# Patient Record
Sex: MALE | Race: OTHER
[De-identification: names, ages, dates, MRNs, and addresses within clinical notes are randomized per-mention and may not be internally consistent; named-entity substitution may affect disease eponyms.]

---

## 2021-05-19 ENCOUNTER — HOSPITAL ENCOUNTER (EMERGENCY)
Dept: HOSPITAL 12 - ER | Age: 31
LOS: 1 days | Discharge: HOME | End: 2021-05-20
Payer: SELF-PAY

## 2021-05-19 VITALS — BODY MASS INDEX: 24.25 KG/M2 | WEIGHT: 160 LBS | HEIGHT: 68 IN

## 2021-05-19 DIAGNOSIS — F15.159: Primary | ICD-10-CM

## 2021-05-19 DIAGNOSIS — R00.0: ICD-10-CM

## 2021-05-19 DIAGNOSIS — E87.2: ICD-10-CM

## 2021-05-19 DIAGNOSIS — R94.31: ICD-10-CM

## 2021-05-19 DIAGNOSIS — F15.129: ICD-10-CM

## 2021-05-19 LAB
ALP SERPL-CCNC: 75 U/L (ref 50–136)
ALT SERPL W/O P-5'-P-CCNC: 35 U/L (ref 16–63)
APAP SERPL-MCNC: < 2 UG/ML (ref 10–30)
AST SERPL-CCNC: 38 U/L (ref 15–37)
BASOPHILS # BLD AUTO: 0 K/UL (ref 0–8)
BASOPHILS NFR BLD AUTO: 0.3 % (ref 0–2)
BILIRUB DIRECT SERPL-MCNC: 0.1 MG/DL (ref 0–0.2)
BILIRUB SERPL-MCNC: 0.3 MG/DL (ref 0.2–1)
BUN SERPL-MCNC: 16 MG/DL (ref 7–18)
CHLORIDE SERPL-SCNC: 102 MMOL/L (ref 98–107)
CO2 SERPL-SCNC: 23 MMOL/L (ref 21–32)
CREAT SERPL-MCNC: 1.2 MG/DL (ref 0.6–1.3)
EOSINOPHIL # BLD AUTO: 0.4 K/UL (ref 0–0.7)
EOSINOPHIL NFR BLD AUTO: 2.7 % (ref 0–7)
ETHANOL SERPL-MCNC: < 3 MG/DL (ref 0–0)
GLUCOSE SERPL-MCNC: 137 MG/DL (ref 74–106)
HCT VFR BLD AUTO: 45 % (ref 36.7–47.1)
HGB BLD-MCNC: 14.6 G/DL (ref 12.5–16.3)
LYMPHOCYTES # BLD AUTO: 6.2 K/UL (ref 20–40)
LYMPHOCYTES NFR BLD AUTO: 41.6 % (ref 20.5–51.5)
MCH RBC QN AUTO: 31.7 UUG (ref 23.8–33.4)
MCHC RBC AUTO-ENTMCNC: 32 G/DL (ref 32.5–36.3)
MCV RBC AUTO: 97.8 FL (ref 73–96.2)
MONOCYTES # BLD AUTO: 1.4 K/UL (ref 2–10)
MONOCYTES NFR BLD AUTO: 9.5 % (ref 0–11)
NEUTROPHILS # BLD AUTO: 6.9 K/UL (ref 1.8–8.9)
NEUTROPHILS NFR BLD AUTO: 45.9 % (ref 38.5–71.5)
PLATELET # BLD AUTO: 406 K/UL (ref 152–348)
POTASSIUM SERPL-SCNC: 3.8 MMOL/L (ref 3.5–5.1)
RBC # BLD AUTO: 4.6 MIL/UL (ref 4.06–5.63)
WBC # BLD AUTO: 15 K/UL (ref 3.6–10.2)
WS STN SPEC: 8 G/DL (ref 6.4–8.2)

## 2021-05-19 PROCEDURE — 96376 TX/PRO/DX INJ SAME DRUG ADON: CPT

## 2021-05-19 PROCEDURE — 80048 BASIC METABOLIC PNL TOTAL CA: CPT

## 2021-05-19 PROCEDURE — 99285 EMERGENCY DEPT VISIT HI MDM: CPT

## 2021-05-19 PROCEDURE — 80307 DRUG TEST PRSMV CHEM ANLYZR: CPT

## 2021-05-19 PROCEDURE — 71045 X-RAY EXAM CHEST 1 VIEW: CPT

## 2021-05-19 PROCEDURE — 85025 COMPLETE CBC W/AUTO DIFF WBC: CPT

## 2021-05-19 PROCEDURE — 80320 DRUG SCREEN QUANTALCOHOLS: CPT

## 2021-05-19 PROCEDURE — 96374 THER/PROPH/DIAG INJ IV PUSH: CPT

## 2021-05-19 PROCEDURE — 36415 COLL VENOUS BLD VENIPUNCTURE: CPT

## 2021-05-19 PROCEDURE — 96375 TX/PRO/DX INJ NEW DRUG ADDON: CPT

## 2021-05-19 PROCEDURE — 80076 HEPATIC FUNCTION PANEL: CPT

## 2021-05-19 PROCEDURE — 93005 ELECTROCARDIOGRAM TRACING: CPT

## 2021-05-19 PROCEDURE — 83605 ASSAY OF LACTIC ACID: CPT

## 2021-05-19 PROCEDURE — 96361 HYDRATE IV INFUSION ADD-ON: CPT

## 2021-05-19 PROCEDURE — 80299 QUANTITATIVE ASSAY DRUG: CPT

## 2021-05-19 PROCEDURE — 81001 URINALYSIS AUTO W/SCOPE: CPT

## 2021-05-19 PROCEDURE — G0480 DRUG TEST DEF 1-7 CLASSES: HCPCS

## 2021-05-19 NOTE — NUR
Pt. bib RA 83 from home. RA 83 reported a possible overdose. Pt. restless, 
uncooperative, and moving limbs and thrashing. Pt. unable to answer questions. 
Attempts made to orient and calm patient were unsuccessful. Pt. kicked and 
attempted to bite himself and staff. MD notified and 4 pt. restraints placed as 
ordered. Vss. Will continue to monitor.

## 2021-05-20 LAB
AMORPH URATE CRY URNS QL MICRO: (no result) /HPF
AMPHETAMINES UR QL SCN>1000 NG/ML: POSITIVE
APPEARANCE UR: (no result)
BILIRUB UR QL STRIP: NEGATIVE
COCAINE UR QL SCN: NEGATIVE
COLOR UR: YELLOW
DEPRECATED SQUAMOUS URNS QL MICRO: (no result) /HPF
GLUCOSE UR STRIP-MCNC: NEGATIVE MG/DL
HGB UR QL STRIP: NEGATIVE
KETONES UR STRIP-MCNC: NEGATIVE MG/DL
LEUKOCYTE ESTERASE UR QL STRIP: NEGATIVE
NITRITE UR QL STRIP: NEGATIVE
OPIATES UR QL SCN: NEGATIVE
PCP UR QL SCN>25 NG/ML: NEGATIVE
PH UR STRIP: 6.5 [PH] (ref 5–8)
RBC #/AREA URNS HPF: (no result) /HPF (ref 0–3)
SP GR UR STRIP: >=1.03 (ref 1–1.03)
THC UR QL SCN>50 NG/ML: POSITIVE
UROBILINOGEN UR STRIP-MCNC: 0.2 E.U./DL
WBC #/AREA URNS HPF: (no result) /HPF
WBC #/AREA URNS HPF: (no result) /HPF (ref 0–3)

## 2021-05-20 NOTE — NUR
Pt is awake, alert, oriented and ambulatory with steady gait. Pt ate and denied 
any SI/HI. No s/s of distress noted. Patient discharged to home in stable 
condition.  Written and verbal after care instructions given. Patient 
verbalized understanding of instructions. Stressed follow up or return to ER 
for worsening s/s.

## 2021-05-20 NOTE — NUR
Pt. still uncooperative. No longer trying to remove IV or kick staff, R wris 
and left leg restraints removed. neurovascular status intact. Will continue to 
monitor. Vss.

## 2021-05-20 NOTE — NUR
Staff removed alternating wrist and leg restraints intermittently but patient 
increased in agitation, thrashing, head hitting and attempted biting. Pt. 
danger to self and others. MD was notified. 4 pt. restraints reapplied and 
ativan and haldol were given.

## 2021-05-20 NOTE — NUR
L wrist restraint removed. Pt. calmer but still uncooperative. Pt. does not 
answer questions, thrashes. Neurovascular status intact. vss. Will continue to 
monitor.

## 2021-05-20 NOTE — NUR
Pt. still agitated, restless and uncooperative. MD notified and new order to 
continue 4 pt. restraints continued. Neurovascular status is intact and vss. 
Will continue to monitor.

## 2021-05-20 NOTE — NUR
Pt. increasingly kicking, yelling and trying to remove IV. Non-invasive 
measures attempted to calm and reorient patient were unsuccessful. MD notified.

## 2021-05-20 NOTE — NUR
-------------------------------------------------------------------------------

            *** Note zia in EDM - 05/20/21 at 0616 by JENNIFER ***            

-------------------------------------------------------------------------------

Staff removed alternating wrist and leg restraints intermittently but patient 
increased in agitation, thrashing, head hitting and attempted biting. Pt. 
danger to self and others. MD was notified. 4 pt. restraints reapplied and 
ativan and haldol were given.

## 2021-05-20 NOTE — NUR
LATE ENTRY: DOCUMENTATION FOR NS IVF BOLUS #1 AND #2:



NS 1000 ML IV BOLUS #1 GIVEN VIA LAC.

START TIME: 5/19/21 2316

END TIME   : 5/20/21 0020



NS 1000ML IV BOLUS #2 GIVEN VIA LAC.

START TIME: 5/20/21 0020

END TIME   : 5/20/21 0120

## 2021-05-20 NOTE — NUR
Removed left wrist and right leg. pt. less agitated and no longer attempting to 
bite but still uncooperative and thrasing. Nonivasive measures to calm patient 
not effective.  Neurovascular status intact. Will continue to monitor.